# Patient Record
Sex: MALE
[De-identification: names, ages, dates, MRNs, and addresses within clinical notes are randomized per-mention and may not be internally consistent; named-entity substitution may affect disease eponyms.]

---

## 2017-01-06 PROBLEM — Z00.00 ENCOUNTER FOR PREVENTIVE HEALTH EXAMINATION: Status: ACTIVE | Noted: 2017-01-06

## 2017-01-10 ENCOUNTER — APPOINTMENT (OUTPATIENT)
Dept: INTERNAL MEDICINE | Facility: CLINIC | Age: 57
End: 2017-01-10

## 2017-11-28 ENCOUNTER — TRANSCRIPTION ENCOUNTER (OUTPATIENT)
Age: 57
End: 2017-11-28

## 2017-11-30 ENCOUNTER — APPOINTMENT (OUTPATIENT)
Dept: INTERNAL MEDICINE | Facility: CLINIC | Age: 57
End: 2017-11-30
Payer: COMMERCIAL

## 2017-11-30 VITALS
WEIGHT: 171 LBS | TEMPERATURE: 98 F | BODY MASS INDEX: 28.49 KG/M2 | DIASTOLIC BLOOD PRESSURE: 82 MMHG | RESPIRATION RATE: 12 BRPM | OXYGEN SATURATION: 97 % | SYSTOLIC BLOOD PRESSURE: 166 MMHG | HEIGHT: 65 IN | HEART RATE: 76 BPM

## 2017-11-30 DIAGNOSIS — Z78.9 OTHER SPECIFIED HEALTH STATUS: ICD-10-CM

## 2017-11-30 DIAGNOSIS — J45.20 MILD INTERMITTENT ASTHMA, UNCOMPLICATED: ICD-10-CM

## 2017-11-30 DIAGNOSIS — B96.89 PERSONAL HISTORY OF OTHER DISEASES OF THE RESPIRATORY SYSTEM: ICD-10-CM

## 2017-11-30 DIAGNOSIS — Z87.09 PERSONAL HISTORY OF OTHER DISEASES OF THE RESPIRATORY SYSTEM: ICD-10-CM

## 2017-11-30 PROCEDURE — 99203 OFFICE O/P NEW LOW 30 MIN: CPT

## 2017-11-30 RX ORDER — ALBUTEROL SULFATE 90 UG/1
108 (90 BASE) AEROSOL, METERED RESPIRATORY (INHALATION)
Qty: 1 | Refills: 0 | Status: ACTIVE | COMMUNITY
Start: 2017-11-30 | End: 1900-01-01

## 2020-12-15 PROBLEM — Z87.09 HISTORY OF ACUTE BACTERIAL SINUSITIS: Status: RESOLVED | Noted: 2017-11-30 | Resolved: 2020-12-15

## 2021-11-03 ENCOUNTER — TRANSCRIPTION ENCOUNTER (OUTPATIENT)
Age: 61
End: 2021-11-03

## 2021-11-17 ENCOUNTER — APPOINTMENT (OUTPATIENT)
Dept: PULMONOLOGY | Facility: CLINIC | Age: 61
End: 2021-11-17
Payer: MEDICAID

## 2021-11-17 VITALS
HEIGHT: 65 IN | HEART RATE: 78 BPM | WEIGHT: 180.13 LBS | RESPIRATION RATE: 15 BRPM | OXYGEN SATURATION: 97 % | SYSTOLIC BLOOD PRESSURE: 167 MMHG | DIASTOLIC BLOOD PRESSURE: 97 MMHG | TEMPERATURE: 98.1 F | BODY MASS INDEX: 30.01 KG/M2

## 2021-11-17 DIAGNOSIS — R05.9 COUGH, UNSPECIFIED: ICD-10-CM

## 2021-11-17 PROCEDURE — 99204 OFFICE O/P NEW MOD 45 MIN: CPT

## 2021-11-17 RX ADMIN — BENZONATATE 0 MG: 100 CAPSULE ORAL at 00:00

## 2021-11-17 NOTE — PHYSICAL EXAM
[No Acute Distress] : no acute distress [II] : Mallampati Class: II [Normal Appearance] : normal appearance [Normal Rate/Rhythm] : normal rate/rhythm [No Resp Distress] : no resp distress [Clear to Auscultation Bilaterally] : clear to auscultation bilaterally [Benign] : benign [No Clubbing] : no clubbing [Oriented x3] : oriented x3 [Normal Affect] : normal affect

## 2021-11-17 NOTE — REVIEW OF SYSTEMS
[Fever] : no fever [Postnasal Drip] : postnasal drip [Cough] : cough [Chest Discomfort] : no chest discomfort [Nasal Discharge] : nasal discharge [GERD] : no gerd [Headache] : no headache

## 2021-11-17 NOTE — ASSESSMENT
[FreeTextEntry1] : 60 y/o M with no significant PMH presented to the pulmonary clinic with complaint chronic cough.\par \par 1.  Chronic cough-\par -Etiology unclear at this time but may be related to upper airway cough syndrome 2/2 post nasal gtt\par -Patient notes symptoms are that of a sensation in the back of his throat causing him to cough\par -History of GERD but states he hasn't symptoms recently; not on an ARB\par -Social smoker with no history of reactive airway disease\par -Clinically, he is stable saturating 98% on room air\par -PE: CTA bilaterally\par -Trial of Flonase BID\par -Check a formal set of PFTs\par -Check a CXR\par \par Follow up in 2-3 months\par \par Reginaldo Larsen DO\par Pulmonary, Critical Care and Sleep Medicine Attending

## 2021-11-17 NOTE — HISTORY OF PRESENT ILLNESS
[TextBox_4] : 62 y/o M with no significant PMH presented to the pulmonary clinic with complaint chronic cough.\par \par He notes his symptoms have been ongoing for many years.  Has a waxing and waning nature to it.  Endorses that he feels a drip in the back of his throat and notes clearing of his throat constantly during the day.  He was recently seen by his PCP and was started on a 5-day course of Azithro with some improvement initially but now symptoms persist.  Sputum is mostly clear in nature.  No fevers.  No sick contacts.  Mild case of COVID in 02/2020 not requiring hospital admission.  \par \par He has no sleep complaints at this time.  Sleep is restorative and denies EDS.

## 2021-11-18 ENCOUNTER — APPOINTMENT (OUTPATIENT)
Dept: RADIOLOGY | Facility: CLINIC | Age: 61
End: 2021-11-18
Payer: MEDICAID

## 2021-11-18 PROCEDURE — 71046 X-RAY EXAM CHEST 2 VIEWS: CPT

## 2021-12-27 ENCOUNTER — TRANSCRIPTION ENCOUNTER (OUTPATIENT)
Age: 61
End: 2021-12-27

## 2021-12-27 ENCOUNTER — APPOINTMENT (OUTPATIENT)
Dept: PULMONOLOGY | Facility: CLINIC | Age: 61
End: 2021-12-27

## 2022-11-01 ENCOUNTER — NON-APPOINTMENT (OUTPATIENT)
Age: 62
End: 2022-11-01

## 2022-11-03 ENCOUNTER — NON-APPOINTMENT (OUTPATIENT)
Age: 62
End: 2022-11-03

## 2022-11-04 ENCOUNTER — APPOINTMENT (OUTPATIENT)
Dept: PULMONOLOGY | Facility: CLINIC | Age: 62
End: 2022-11-04

## 2022-11-04 ENCOUNTER — NON-APPOINTMENT (OUTPATIENT)
Age: 62
End: 2022-11-04

## 2022-11-04 DIAGNOSIS — U07.1 COVID-19: ICD-10-CM

## 2022-11-04 PROCEDURE — 99214 OFFICE O/P EST MOD 30 MIN: CPT | Mod: 95

## 2022-11-04 RX ORDER — AMOXICILLIN AND CLAVULANATE POTASSIUM 875; 125 MG/1; MG/1
875-125 TABLET, COATED ORAL
Qty: 14 | Refills: 0 | Status: DISCONTINUED | COMMUNITY
Start: 2017-11-30 | End: 2022-11-04

## 2022-11-04 RX ORDER — BENZONATATE 100 MG/1
100 CAPSULE ORAL 3 TIMES DAILY
Qty: 90 | Refills: 0 | Status: ACTIVE | COMMUNITY
Start: 2021-11-17

## 2022-11-04 RX ORDER — AZELASTINE HYDROCHLORIDE 137 UG/1
0.1 SPRAY, METERED NASAL TWICE DAILY
Qty: 1 | Refills: 0 | Status: ACTIVE | COMMUNITY
Start: 2022-11-04 | End: 1900-01-01

## 2022-11-04 RX ORDER — IPRATROPIUM BROMIDE 42 UG/1
0.06 SPRAY NASAL 3 TIMES DAILY
Qty: 15 | Refills: 0 | Status: DISCONTINUED | COMMUNITY
Start: 2017-11-30 | End: 2022-11-04

## 2022-11-04 NOTE — HISTORY OF PRESENT ILLNESS
[Home] : at home, [unfilled] , at the time of the visit. [Verbal consent obtained from patient] : the patient, [unfilled] [Medical Office: ([facility])___] : at the medical office located in  [FreeTextEntry1] : Mr. Garner is a 63 yo M w/ no known PMH who was referred to [facility] after testing positive for COVID 19 on 11/2/2022. His symptoms started on Monday 10/31/22. At the time he had worsening cough and fatigue. He went to  urgent care and on 11/2, tested positive for COVID. He was prescribed Paxlovid at urgent care. He is tolerating it well, main complaint is dysgeusia. Today is day 3 of 5 day course. He feels symptoms are unchanged. He continues to have cough with sputum production. No hemoptysis. No chest pain. No shortness of breath. No fevers. He was able to take 1/2 mile walk outside today. He states he has no renal or hepatic dysfunction. \par \par  At baseline Mr. Garner is healthy, he states he does not take any medications regularly at home. He was previously seen in pulmonary clinic By Dr. Larsen in Nov 2021 for chronic cough. \par \par No prior PFTs in our system. He is an active smoker - smokes 1-2 cig per month over 20 years. No known history of COPD. \par \par On video  - limited visual exam, he is awake, alert, speaking in full sentences. No audible wheezing. No respiratory distress. No witnessed cough. He is ambulating normally around his home.

## 2022-12-01 ENCOUNTER — RX RENEWAL (OUTPATIENT)
Age: 62
End: 2022-12-01

## 2022-12-07 ENCOUNTER — APPOINTMENT (OUTPATIENT)
Dept: PULMONOLOGY | Facility: CLINIC | Age: 62
End: 2022-12-07

## 2022-12-07 DIAGNOSIS — R05.8 OTHER SPECIFIED COUGH: ICD-10-CM

## 2022-12-07 DIAGNOSIS — G47.30 SLEEP APNEA, UNSPECIFIED: ICD-10-CM

## 2022-12-07 PROCEDURE — 94729 DIFFUSING CAPACITY: CPT

## 2022-12-07 PROCEDURE — 94060 EVALUATION OF WHEEZING: CPT

## 2022-12-07 PROCEDURE — 99406 BEHAV CHNG SMOKING 3-10 MIN: CPT

## 2022-12-07 PROCEDURE — 94726 PLETHYSMOGRAPHY LUNG VOLUMES: CPT

## 2022-12-07 PROCEDURE — ZZZZZ: CPT

## 2022-12-07 PROCEDURE — 99214 OFFICE O/P EST MOD 30 MIN: CPT | Mod: 25

## 2022-12-08 PROBLEM — R05.8 UPPER AIRWAY COUGH SYNDROME: Status: ACTIVE | Noted: 2021-11-17

## 2022-12-08 PROBLEM — G47.30 SLEEP APNEA: Status: ACTIVE | Noted: 2022-12-08

## 2022-12-08 RX ORDER — FLUTICASONE PROPIONATE 50 UG/1
50 SPRAY, METERED NASAL TWICE DAILY
Qty: 1 | Refills: 2 | Status: ACTIVE | COMMUNITY
Start: 2021-11-17 | End: 1900-01-01

## 2022-12-08 NOTE — HISTORY OF PRESENT ILLNESS
[Current] : current [Never] : never [Obstructive Sleep Apnea] : obstructive sleep apnea [TextBox_4] : Mr. Garner is a 63 yo M w/ no significant medical history presents for follow up after televisit for COVID. Mr. Garner had COVID in November. At the time he was prescribed paxolovid. He tolerated it well. AT the time he had complained of a chronic cough in additon to acute COVID symptoms. He presents today for a follow up visit. \par \par He has a had a chronic cough for over 2 years. At times sputum is clear, at times brownish. Never any hemoptysis. No recent fevers, chills other than COVID infection. He feels cough is worse in the winter or when weather is cold. Of note - he had no cough in [place], he noticed it started back when he returned to [place]. He has chronic nasal congestion and feelings of post nasal drip. In the past he tried fluticasone nose pray which helped with cough. He has no known history of asthma or COPD. He is a current smoker. Currently smokes 1-2 cig/week, \par Started smoking at age 17 - 1 pack every 2 months  (1/2 pack/month since 17). He does not currently have any inhalers at home. \par  \par  He is from [place] but splits his time here and in [place]. He works in car sales. Allergies - grass, pollen, seasonal. \par  \par He mentioned he snores loudly. He states in the past he had a sleep study. This occurred almost 15 years ago. He was told he has sleep apnea and given a "machine."  He was non adherent and is not currently on CPAP. Current EPSS is 6. \par \par \par \par  \par \par    [Difficulty Initiating Sleep] : difficulty initiating sleep [Frequent Nocturnal Awakening] : frequent nocturnal awakening [Snoring] : snoring

## 2022-12-28 ENCOUNTER — RX RENEWAL (OUTPATIENT)
Age: 62
End: 2022-12-28

## 2022-12-28 RX ORDER — ALBUTEROL SULFATE 90 UG/1
108 (90 BASE) INHALANT RESPIRATORY (INHALATION)
Qty: 1 | Refills: 0 | Status: ACTIVE | COMMUNITY
Start: 2022-11-04 | End: 1900-01-01